# Patient Record
(demographics unavailable — no encounter records)

---

## 2024-12-12 NOTE — HISTORY OF PRESENT ILLNESS
[de-identified] : 3 yo girl with chronic nonbloody diarrhea and increased flatulence. Child had pasty stools as an infant on breast milk, but stools very watery since turning 1 yr of age. Eliminating all lactose from the diet without benefit. Child drinks small amounts of diluted apple juice qd and eats small to moderate amounts of fruit. Mother does not give her sweets apart from some chocolate milk on occasion. She drinks water and Rice Dream. Child has been gaining weight and growing normally. No FH GI disease but multiple maternal relatives with Hashimoto's. No lab evaluation has been done.

## 2024-12-12 NOTE — PHYSICAL EXAM
[Well Developed] : well developed [Well Nourished] : well nourished [NAD] : in no acute distress [Pallor] : no pallor [PERRL] : pupils were equal, round, reactive to light  [icteric] : anicteric [Moist & Pink Mucous Membranes] : moist and pink mucous membranes [CTAB] : lungs clear to auscultation bilaterally [Respiratory Distress] : no respiratory distress  [Wheeze] : no wheezing  [Regular Rate and Rhythm] : regular rate and rhythm [Normal S1, S2] : normal S1 and S2 [Murmur] : no murmur [Soft] : soft  [Distended] : non distended [Tender] : non tender [Normal Bowel Sounds] : normal bowel sounds [Guarding] : no guarding [Stool Palpable] : no stool palpable [Mass ___ cm] : no masses were palpated [No HSM] : no hepatosplenomegaly appreciated [No Back Lesion] : no back lesion [Lymphadenopathy] : no lymphadenopathy  [Joint Swelling] : no joint swelling [Normal Tone] : normal tone [Focal Deficits] : no focal deficits [Well-Perfused] : well-perfused [Edema] : no edema [Cyanosis] : no cyanosis [Rash] : no rash [Jaundice] : no jaundice [Interactive] : interactive [Appropriate Affect] : appropriate affect [Appropriate Behavior] : appropriate behavior

## 2024-12-12 NOTE — ASSESSMENT
[Educated Patient & Family about Diagnosis] : educated the patient and family about the diagnosis [FreeTextEntry1] : Chronic diarrhea ?etiology REC: 1. Blood work including celiac screening, inflammatory markers today 2. If blood work negative, should submit stools for calprotectin, occult blood, pH 3. Consider EGD for disaccharidase assessment or empiric trial of Sucraid if labs all wnl and diarrhea persists